# Patient Record
Sex: FEMALE | Race: WHITE | NOT HISPANIC OR LATINO | URBAN - METROPOLITAN AREA
[De-identification: names, ages, dates, MRNs, and addresses within clinical notes are randomized per-mention and may not be internally consistent; named-entity substitution may affect disease eponyms.]

---

## 2017-01-10 NOTE — PATIENT DISCUSSION
(H25.13) Age-related nuclear cataract, bilateral - Assesment : Examination revealed cataract. Pt is symptomatic and bothered. - Plan : Recommended cataract extraction to improve visual function. Procedure, lens options, risks, and benefits discussed. All questions answered. Pt wishes to proceed. Schedule [facility] and consult.

## 2017-01-10 NOTE — PATIENT DISCUSSION
(H25.013) Cortical age-related cataract, bilateral - Assesment : Examination revealed Cortical Senile Cataract. - Plan : See plan #1.

## 2017-03-17 NOTE — PATIENT DISCUSSION
(Q36.118) Keratoconjunct sicca, not specified as Sjogren's, bilateral - Assesment : Examination revealed Dry Eye Syndrome--punctate erosions. Recommend getting corneas in better condition before surgery. - Plan : Monitor for changes. Advised patient to call our office with decreased vision or increased symptoms.  Use Systane Balance TID, and Systane gel QHS

## 2017-03-17 NOTE — PATIENT DISCUSSION
(H25.13) Age-related nuclear cataract, bilateral - Assesment : Examination revealed cataract. Pt is symptomatic and bothered. Discussed Limited Focus lens with patient. Advised with this lens, patient will need to wear glasses for best corrected vision at distance and near. Discussed ROF lens with patient. Patient would likely be glasses-independent most of the time with ROF. - Plan : Risks, Benefits and Alternatives were discussed with patient at length for Cataract Surgery. Visual symptoms are consistent with Cataract findings on examination and current refraction no longer provides satisfactory vision. Patient understands and desires surgery. All questions answered. Risks, Benefits and Alternatives discussed at length for IOL placement. Doctor suggests ROF. Patient desires UNDECIDED. Patient will need to wear glasses for dim lighting/small print.   EYE: OD IOL TYPE: ROF/ PT DESIRES TECH PKG (PB) POST OPERATIVE TARGET: PLANO/-0.50 PACKAGE TP OS to follow Patient to see surgery counselor today

## 2017-03-17 NOTE — PATIENT DISCUSSION
(H25.013) Cortical age-related cataract, bilateral - Assesment : Examination revealed Cortical Senile Cataract.  - Plan : See Plan #1

## 2017-04-19 NOTE — PATIENT DISCUSSION
(Z96.1) Presence of intraocular lens - Assesment : Patient is Pseudophakic. ORA was done in OR on operated eye. - Plan : Discussed signs and symptoms of infection and retinal detachments. Do not rub operated eye. Follow drop schedule If redness,pain,decreased vision, flashes or floaters occur then contact clinic.  1 week refraction/dilation

## 2017-04-24 NOTE — PATIENT DISCUSSION
(H25.12) Age-related nuclear cataract, left eye - Assesment : Examination revealed cataract. Pt is symptomatic and bothered. - Plan : Risks, Benefits and Alternatives were discussed with patient at length for Cataract Surgery. Visual symptoms are consistent with Cataract findings on examination and current refraction no longer provides satisfactory vision. Patient understands and desires surgery. All questions answered. Risks, Benefits and Alternatives discussed at length for IOL placement. Doctor suggests ROF. Patient desires TP. Patient will need to wear glasses for dim lighting/small print. EYE: OS IOL TYPE: MONOFOCAL.   PT DESIRES TECH PKG (PB) POST OPERATIVE TARGET: PLANO/-0.25 PACKAGE TP

## 2017-04-24 NOTE — PATIENT DISCUSSION
(U03.158) Keratoconjunct sicca, not specified as Sjogren's, bilateral - Assesment : Examination revealed Dry Eye Syndrome-- OD: FEW PE INFERIOR - Plan : Monitor for changes. Advised patient to call our office with decreased vision or increased symptoms.  Use Systane Balance TID, and Systane gel QHS

## 2017-04-24 NOTE — PATIENT DISCUSSION
(Z96.1) Presence of intraocular lens - Assesment : Patient is Pseudophakic. FEW PE INFERIOR - Plan : Signs and symptoms of infection and retinal detachment are outlined in your surgical packet. Do not rub operated eye. Follow drop schedule. If redness, pain, decreased vision, flashes or floaters occur then contact clinic.

## 2017-05-03 NOTE — PATIENT DISCUSSION
(Z96.1) Presence of intraocular lens - Assesment : Patient is Pseudophakic. ORA done in OR during surgery - Plan : Discussed signs and symptoms of infection and retinal detachments. Do not rub operated eye. Follow drop schedule If redness,pain,decreased vision, flashes or floaters occur then contact clinic.  1 week refraction/dilation

## 2017-05-09 NOTE — PATIENT DISCUSSION
(Z96.1) Presence of intraocular lens - Assesment : Patient is Pseudophakic. IOLS IN GOOD POSITION. - Plan : Signs and symptoms of infection and retinal detachment are outlined in your surgical packet. Do not rub operated eye. Follow drop schedule. If redness, pain, decreased vision, flashes or floaters occur then contact clinic.  3-4 WEEK/ REFRACT/ MAC OCT / FINAL POST OP

## 2017-05-19 NOTE — PATIENT DISCUSSION
(H53.19) Other subjective visual disturbances - Assesment : Patient has subjective complaint of visual distortion, patient complains of difficulty focusing while reading as well as dizziness that has persisted with some nausea. Patient with a history of Anemia and recently discontinued Iron supplement. Patient denies ringing in ears and diplopia. Advised patient that dizziness usually is not associated with eye changes except in the presence of diplopia. Explained that IOL's are centered well and no presence of corneal edema, eyes are healthy. Discussed possible systemic reaction from Durezol as some people have sensitivities to steroid medications, patient states she is sensitive to multiple medications. - Plan : Advised patient to follow up with PCP to investigate cause of dizziness, Recommend trial discontinuing Durezol to see if dizziness improves.  Follow up with Renato Vega as scheduled

## 2017-06-05 NOTE — PATIENT DISCUSSION
(Z96.1) Presence of intraocular lens - Assesment : Patient is Pseudophakic. - Plan : Signs and symptoms of infection and retinal detachment are outlined in your surgical packet. Do not rub operated eye. Follow drop schedule. If redness, pain, decreased vision, flashes or floaters occur then contact clinic.  Rtc 01/2017 Exam with [facility]

## 2017-06-05 NOTE — PATIENT DISCUSSION
(G97.764) Vitreous degeneration, bilateral - Assesment : Examination revealed PVD - Plan : Monitor for changes. Optical coherence tomography performed.

## 2018-05-31 NOTE — PATIENT DISCUSSION
(H26.101) Other secondary cataract, right eye - Assesment : Significant posterior capsule opacification present OD. Patient is currently asymptomatic and functioning well. - Plan : Monitor for Changes. Advised patient to call our office with decreased vision or increased symptoms.

## 2018-05-31 NOTE — PATIENT DISCUSSION
(S30.251) Keratoconjunct sicca, not specified as Sjogren's, bilateral - Assesment : Examination revealed Dry Eye Syndrome ,OU. SPK OD. - Plan : Monitor for changes. Advised patient to call our office with decreased vision or increased symptoms. Recommend the use of ATs 3-4 times daily especially before reading.

## 2018-05-31 NOTE — PATIENT DISCUSSION
(N41.920) Vitreous degeneration, bilateral - Assesment : Examination revealed PVD OD>OS. - Plan : Monitor for changes.

## 2022-02-14 ENCOUNTER — PREPPED CHART (OUTPATIENT)
Dept: URBAN - METROPOLITAN AREA CLINIC 10 | Facility: CLINIC | Age: 67
End: 2022-02-14

## 2022-03-15 ENCOUNTER — ESTABLISHED PATIENT (OUTPATIENT)
Dept: URBAN - METROPOLITAN AREA CLINIC 7 | Facility: CLINIC | Age: 67
End: 2022-03-15

## 2022-03-15 DIAGNOSIS — H52.4: ICD-10-CM

## 2022-03-15 DIAGNOSIS — H25.13: ICD-10-CM

## 2022-03-15 PROCEDURE — 92014 COMPRE OPH EXAM EST PT 1/>: CPT

## 2022-03-15 PROCEDURE — 92015 DETERMINE REFRACTIVE STATE: CPT

## 2022-03-15 ASSESSMENT — VISUAL ACUITY
OD_SC: 20/70
OU_SC: 20/80
OS_SC: 20/60

## 2022-03-15 ASSESSMENT — KERATOMETRY
OS_K2POWER_DIOPTERS: 46.50
OD_K1POWER_DIOPTERS: 45.25
OD_K2POWER_DIOPTERS: 46.00
OS_AXISANGLE2_DEGREES: 173
OD_AXISANGLE_DEGREES: 107
OD_AXISANGLE2_DEGREES: 17
OS_K1POWER_DIOPTERS: 45.50
OS_AXISANGLE_DEGREES: 83

## 2022-03-15 ASSESSMENT — TONOMETRY
OS_IOP_MMHG: 17
OD_IOP_MMHG: 16